# Patient Record
Sex: MALE | Race: WHITE | NOT HISPANIC OR LATINO | Employment: OTHER | URBAN - METROPOLITAN AREA
[De-identification: names, ages, dates, MRNs, and addresses within clinical notes are randomized per-mention and may not be internally consistent; named-entity substitution may affect disease eponyms.]

---

## 2020-10-13 ENCOUNTER — OFFICE VISIT (OUTPATIENT)
Dept: PODIATRY | Facility: CLINIC | Age: 85
End: 2020-10-13

## 2020-10-13 VITALS
HEART RATE: 84 BPM | SYSTOLIC BLOOD PRESSURE: 158 MMHG | BODY MASS INDEX: 21.42 KG/M2 | DIASTOLIC BLOOD PRESSURE: 96 MMHG | HEIGHT: 71 IN | WEIGHT: 153 LBS

## 2020-10-13 DIAGNOSIS — L60.3 ONYCHODYSTROPHY: ICD-10-CM

## 2020-10-13 DIAGNOSIS — B35.1 ONYCHOMYCOSIS: Primary | ICD-10-CM

## 2020-10-13 PROCEDURE — 11721 DEBRIDE NAIL 6 OR MORE: CPT | Performed by: PODIATRIST

## 2020-10-13 PROCEDURE — 99203 OFFICE O/P NEW LOW 30 MIN: CPT | Performed by: PODIATRIST

## 2020-10-13 RX ORDER — MEMANTINE HYDROCHLORIDE 5 MG/1
TABLET ORAL
COMMUNITY
Start: 2020-09-29

## 2020-10-14 NOTE — PROGRESS NOTES
10/13/2020  Foot and Ankle Surgery - New Patient   Provider: Dr. Black Tatum DPM  Location: [facility]    Subjective:  Jean Paul Andres is a 86 y.o. male.     Chief Complaint   Patient presents with   • Nail Problem       HPI: Patient is an 86-year-old male that presents with his daughter and wife.  He complains of thick and dystrophic great toenails which he and his family is unable to trim.  His daughter states that she is able to manage the lesser toes.  He is unable to see to trim his toenails.  He denies any prominent pain or limitation at this time.  He has not had any recent ingrown nails or infections.  He denies any other issues with his feet.    Allergies   Allergen Reactions   • Proscar [Finasteride] Swelling       History reviewed. No pertinent past medical history.    History reviewed. No pertinent surgical history.    History reviewed. No pertinent family history.    Social History     Socioeconomic History   • Marital status:      Spouse name: Not on file   • Number of children: Not on file   • Years of education: Not on file   • Highest education level: Not on file        Current Outpatient Medications on File Prior to Visit   Medication Sig Dispense Refill   • memantine (NAMENDA) 5 MG tablet TK 1 T PO BID FOR MEMORY PRESERVATION AND COGITIVE FUNTION       No current facility-administered medications on file prior to visit.        Review of Systems:  General: Denies fever, chills, fatigue, and weakness.  Eyes: Denies vision loss, blurry vision, and excessive redness.  ENT: Denies hearing issues and difficulty swallowing.  Cardiovascular: Denies palpitations, chest pain, or syncopal episodes.  Respiratory: Denies shortness of breath, wheezing, and coughing.  GI: Denies abdominal pain, nausea, and vomiting.   : Denies frequency, hematuria, and urgency.  Musculoskeletal: Denies muscle cramps, joint pains, and stiffness.  Derm: + Dystrophic nails  Neuro: Denies headaches,  "numbness, loss of coordination, and tremors.  Psych: Denies anxiety and depression.  Endocrine: Denies temperature intolerance and changes in appetite.  Heme: Denies bleeding disorders or abnormal bruising.     Objective   /96   Pulse 84   Ht 180.3 cm (71\")   Wt 69.4 kg (153 lb)   BMI 21.34 kg/m²     Foot/Ankle Exam:       General:   Appearance: appears stated age and healthy and elderly    Orientation: AAOx3    Affect: appropriate    Assistance: assistance by care giver      VASCULAR      Right Foot Vascularity   Normal vascular exam    Dorsalis pedis:  2+  Posterior tibial:  2+  Skin Temperature: warm    Edema Grading:  None  CFT:  < 3 seconds  Pedal Hair Growth:  Absent  Varicosities: mild varicosities       Left Foot Vascularity   Normal vascular exam    Dorsalis pedis:  2+  Posterior tibial:  2+  Skin Temperature: warm    Edema Grading:  None  CFT:  < 3 seconds  Pedal Hair Growth:  Absent  Varicosities: mild varicosities        NEUROLOGIC     Right Foot Neurologic   Light touch sensation:  Normal  Hot/Cold sensation: normal    Achilles reflex:  2+     Left Foot Neurologic   Light touch sensation:  Normal  Hot/cold sensation: normal    Achilles reflex:  2+     MUSCULOSKELETAL      Right Foot Musculoskeletal   Ecchymosis:  None  Tenderness: none    Arch:  Normal     Left Foot Musculoskeletal   Ecchymosis:  None  Tenderness: none    Arch:  Normal     MUSCLE STRENGTH     Right Foot Muscle Strength   Normal strength    Foot dorsiflexion:  5  Foot plantar flexion:  5  Foot inversion:  5  Foot eversion:  5     Left Foot Muscle Strength   Normal strength    Foot dorsiflexion:  5  Foot plantar flexion:  5  Foot inversion:  5  Foot eversion:  5     DERMATOLOGIC     Right Foot Dermatologic   Skin: skin intact    Nails: onychomycosis, abnormally thick, dystrophic nails and longitudinal ridges       Left Foot Dermatologic   Skin: skin intact    Nails: onychomycosis, abnormally thick, dystrophic nails and " longitudinal ridges        Right Foot Additional Comments Mild discomfort with palpation involving the great toe nails.  No gross deformity or instability.       Assessment/Plan   Diagnoses and all orders for this visit:    1. Onychomycosis (Primary)    2. Onychodystrophy      Patient presents for routine nail care.  He is unable to trim his nails and his daughter states that she cannot manage the great toenails.  He does have prominent dystrophy and thickness.  He surprisingly does not have any prominent pain or limitation.  We did discuss the diagnosis and treatment options.  We did review routine nail care and nails were debrided x10 today without complication.  I did discuss definitive treatment options including total nail avulsion with chemical matrixectomy.  We did briefly discuss the treatment option and patient states that he will consider.  At this time, I have asked that he monitor his feet closely and call with any issues; otherwise, I am happy to see him in 4 months for routine nail care.    No orders of the defined types were placed in this encounter.       Note is dictated utilizing voice recognition software. Unfortunately this leads to occasional typographical errors. I apologize in advance if the situation occurs. If questions occur please do not hesitate to call our office.

## 2022-03-06 ENCOUNTER — LAB REQUISITION (OUTPATIENT)
Dept: LAB | Facility: HOSPITAL | Age: 87
End: 2022-03-06

## 2022-03-06 DIAGNOSIS — Z00.00 ENCOUNTER FOR GENERAL ADULT MEDICAL EXAMINATION WITHOUT ABNORMAL FINDINGS: ICD-10-CM

## 2022-03-06 LAB
BACTERIA UR QL AUTO: ABNORMAL /HPF
BILIRUB UR QL STRIP: NEGATIVE
CLARITY UR: ABNORMAL
COLOR UR: YELLOW
GLUCOSE UR STRIP-MCNC: NEGATIVE MG/DL
HGB UR QL STRIP.AUTO: ABNORMAL
HYALINE CASTS UR QL AUTO: ABNORMAL /LPF
KETONES UR QL STRIP: NEGATIVE
LEUKOCYTE ESTERASE UR QL STRIP.AUTO: ABNORMAL
NITRITE UR QL STRIP: NEGATIVE
PH UR STRIP.AUTO: 6 [PH] (ref 5–8)
PROT UR QL STRIP: ABNORMAL
RBC # UR STRIP: ABNORMAL /HPF
REF LAB TEST METHOD: ABNORMAL
SP GR UR STRIP: 1.02 (ref 1–1.03)
SQUAMOUS #/AREA URNS HPF: ABNORMAL /HPF
UROBILINOGEN UR QL STRIP: ABNORMAL
WBC # UR STRIP: ABNORMAL /HPF

## 2022-03-06 PROCEDURE — 87086 URINE CULTURE/COLONY COUNT: CPT | Performed by: FAMILY MEDICINE

## 2022-03-06 PROCEDURE — 81001 URINALYSIS AUTO W/SCOPE: CPT | Performed by: FAMILY MEDICINE

## 2022-03-06 PROCEDURE — 87186 SC STD MICRODIL/AGAR DIL: CPT | Performed by: FAMILY MEDICINE

## 2022-03-06 PROCEDURE — 87077 CULTURE AEROBIC IDENTIFY: CPT | Performed by: FAMILY MEDICINE

## 2022-03-08 LAB — BACTERIA SPEC AEROBE CULT: ABNORMAL
